# Patient Record
Sex: FEMALE | URBAN - NONMETROPOLITAN AREA
[De-identification: names, ages, dates, MRNs, and addresses within clinical notes are randomized per-mention and may not be internally consistent; named-entity substitution may affect disease eponyms.]

---

## 2020-06-29 ENCOUNTER — OFFICE VISIT (OUTPATIENT)
Dept: URBAN - NONMETROPOLITAN AREA CLINIC 18 | Facility: CLINIC | Age: 75
End: 2020-06-29
Payer: MEDICARE

## 2020-06-29 DIAGNOSIS — H43.812 VITREOUS DETACHMENT OF LEFT EYE: ICD-10-CM

## 2020-06-29 DIAGNOSIS — H16.9 KERATITIS: ICD-10-CM

## 2020-06-29 PROCEDURE — 99204 OFFICE O/P NEW MOD 45 MIN: CPT | Performed by: OPTOMETRIST

## 2020-06-29 ASSESSMENT — INTRAOCULAR PRESSURE
OD: 14
OS: 14

## 2020-06-29 NOTE — IMPRESSION/PLAN
Impression: Vitreous detachment of left eye: H43.812. Plan: Posterior vitreous detachment accounts for the patient's complaints. There is no evidence of retinal pathology. All signs and risks of retinal detachment and tears were discussed in detail. Patient instructed to call office immediately if any symptoms noted. No further treatment required unless signs/symptoms of retinal detachment develop.

## 2020-06-29 NOTE — IMPRESSION/PLAN
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 Plan: Discussed condition in detail with patient. Explained condition does not have a cure and will need artificial tears for maintenance. Recommended Genteal Gel q2h OU and preservative free artificial tears (Refresh or Systane brand) in between gel use OU. If symptoms persist, consider restasis and/or punctal occlusion.

## 2020-08-10 ENCOUNTER — OFFICE VISIT (OUTPATIENT)
Dept: URBAN - NONMETROPOLITAN AREA CLINIC 18 | Facility: CLINIC | Age: 75
End: 2020-08-10
Payer: MEDICARE

## 2020-08-10 DIAGNOSIS — H52.4 PRESBYOPIA: Chronic | ICD-10-CM

## 2020-08-10 PROCEDURE — 99213 OFFICE O/P EST LOW 20 MIN: CPT | Performed by: OPTOMETRIST

## 2020-08-10 ASSESSMENT — INTRAOCULAR PRESSURE
OS: 13
OD: 13

## 2020-08-10 NOTE — IMPRESSION/PLAN
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 Plan: Discussed condition in detail with patient and difficult control due to diagnoses of Sjogren's and RA. Some improvement with artificial tears and gels, but signs of dry eye are still present. Explained condition does not have a cure and will need artificial tears for maintenance. Patient to continue Genteal Gel q3h OU and preservative free artificial tears (Refresh or Systane brand) in between gel use PRN OU. Patient to start Cequa BID OU (samples given), explained to patient potential for burning after instillation and may take 3-6 months for full effectiveness. Patient to RTC in 1 month for follow up.

## 2020-08-10 NOTE — IMPRESSION/PLAN
Impression: Presbyopia: H52.4. Plan: Pt interested in updated spec Rx.   We will do refraction once cornea has stabilized

## 2020-09-21 ENCOUNTER — OFFICE VISIT (OUTPATIENT)
Dept: URBAN - NONMETROPOLITAN AREA CLINIC 18 | Facility: CLINIC | Age: 75
End: 2020-09-21
Payer: MEDICARE

## 2020-09-21 DIAGNOSIS — H43.813 VITREOUS DEGENERATION, BILATERAL: Chronic | ICD-10-CM

## 2020-09-21 DIAGNOSIS — Z96.1 PRESENCE OF PSEUDOPHAKIA: ICD-10-CM

## 2020-09-21 DIAGNOSIS — M35.01 SICCA SYNDROME WITH KERATOCONJUNCTIVITIS: Primary | Chronic | ICD-10-CM

## 2020-09-21 PROCEDURE — 99213 OFFICE O/P EST LOW 20 MIN: CPT | Performed by: OPTOMETRIST

## 2020-09-21 RX ORDER — DOXYCYCLINE 100 MG/1
100 MG TABLET, FILM COATED ORAL
Qty: 90 | Refills: 1 | Status: INACTIVE
Start: 2020-09-21 | End: 2020-09-22

## 2020-09-21 ASSESSMENT — INTRAOCULAR PRESSURE
OS: 17
OD: 15

## 2020-09-21 NOTE — IMPRESSION/PLAN
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 Plan: Discussed condition in detail with patient and difficult control due to diagnoses of Sjogren's and RA. D/C Cequa due to poor efficacy. Some improvement with artificial tears and gels, but signs of dry eye are still present. Explained condition does not have a cure and will need artificial tears for maintenance. Recommend warm compresses BID for 5+min OU. Patient to continue Genteal Gel q3h OU and preservative free artificial tears (Refresh or Systane brand) in between gel use PRN OU. Genteal is[place] QHS OU. Will start Doxycycline 100 mg QD x 2 weeks, then 50 mg QD x 2.5 months. Patient to RTC in 5-6 weeks for follow up.

## 2020-09-22 RX ORDER — DOXYCYCLINE 100 MG/1
100 MG TABLET, FILM COATED ORAL
Qty: 90 | Refills: 1 | Status: ACTIVE
Start: 2020-09-22